# Patient Record
Sex: MALE | Race: WHITE | NOT HISPANIC OR LATINO | Employment: FULL TIME | URBAN - METROPOLITAN AREA
[De-identification: names, ages, dates, MRNs, and addresses within clinical notes are randomized per-mention and may not be internally consistent; named-entity substitution may affect disease eponyms.]

---

## 2021-01-19 ENCOUNTER — APPOINTMENT (OUTPATIENT)
Dept: GENERAL RADIOLOGY | Facility: HOSPITAL | Age: 31
End: 2021-01-19

## 2021-01-19 ENCOUNTER — HOSPITAL ENCOUNTER (EMERGENCY)
Facility: HOSPITAL | Age: 31
Discharge: HOME | End: 2021-01-19
Attending: EMERGENCY MEDICINE | Admitting: EMERGENCY MEDICINE

## 2021-01-19 VITALS
TEMPERATURE: 98.3 F | HEIGHT: 69 IN | SYSTOLIC BLOOD PRESSURE: 133 MMHG | OXYGEN SATURATION: 95 % | BODY MASS INDEX: 46.65 KG/M2 | WEIGHT: 315 LBS | DIASTOLIC BLOOD PRESSURE: 79 MMHG | HEART RATE: 90 BPM | RESPIRATION RATE: 18 BRPM

## 2021-01-19 DIAGNOSIS — R10.11 RIGHT UPPER QUADRANT ABDOMINAL PAIN: Primary | ICD-10-CM

## 2021-01-19 DIAGNOSIS — R80.9 PROTEINURIA, UNSPECIFIED TYPE: ICD-10-CM

## 2021-01-19 LAB
ALBUMIN SERPL-MCNC: 4 G/DL (ref 3.5–5.2)
ALBUMIN/GLOB SERPL: 1.1 G/DL
ALP SERPL-CCNC: 68 U/L (ref 39–117)
ALT SERPL W P-5'-P-CCNC: 56 U/L (ref 1–41)
ANION GAP SERPL CALCULATED.3IONS-SCNC: 8.7 MMOL/L (ref 5–15)
AST SERPL-CCNC: 37 U/L (ref 1–40)
BACTERIA UR QL AUTO: ABNORMAL /HPF
BASOPHILS # BLD AUTO: 0.02 10*3/MM3 (ref 0–0.2)
BASOPHILS NFR BLD AUTO: 0.2 % (ref 0–1.5)
BILIRUB SERPL-MCNC: 0.3 MG/DL (ref 0–1.2)
BILIRUB UR QL STRIP: NEGATIVE
BUN SERPL-MCNC: 14 MG/DL (ref 6–20)
BUN/CREAT SERPL: 14.7 (ref 7–25)
CALCIUM SPEC-SCNC: 8.6 MG/DL (ref 8.6–10.5)
CHLORIDE SERPL-SCNC: 105 MMOL/L (ref 98–107)
CLARITY UR: CLEAR
CO2 SERPL-SCNC: 23.3 MMOL/L (ref 22–29)
COLOR UR: YELLOW
CREAT SERPL-MCNC: 0.95 MG/DL (ref 0.76–1.27)
DEPRECATED RDW RBC AUTO: 40.9 FL (ref 37–54)
EOSINOPHIL # BLD AUTO: 0.14 10*3/MM3 (ref 0–0.4)
EOSINOPHIL NFR BLD AUTO: 1.3 % (ref 0.3–6.2)
ERYTHROCYTE [DISTWIDTH] IN BLOOD BY AUTOMATED COUNT: 12.6 % (ref 12.3–15.4)
GFR SERPL CREATININE-BSD FRML MDRD: 93 ML/MIN/1.73
GLOBULIN UR ELPH-MCNC: 3.5 GM/DL
GLUCOSE SERPL-MCNC: 115 MG/DL (ref 65–99)
GLUCOSE UR STRIP-MCNC: NEGATIVE MG/DL
HCT VFR BLD AUTO: 44.4 % (ref 37.5–51)
HGB BLD-MCNC: 14.7 G/DL (ref 13–17.7)
HGB UR QL STRIP.AUTO: NEGATIVE
HYALINE CASTS UR QL AUTO: ABNORMAL /LPF
IMM GRANULOCYTES # BLD AUTO: 0.02 10*3/MM3 (ref 0–0.05)
IMM GRANULOCYTES NFR BLD AUTO: 0.2 % (ref 0–0.5)
KETONES UR QL STRIP: NEGATIVE
LEUKOCYTE ESTERASE UR QL STRIP.AUTO: NEGATIVE
LIPASE SERPL-CCNC: 33 U/L (ref 13–60)
LYMPHOCYTES # BLD AUTO: 1.64 10*3/MM3 (ref 0.7–3.1)
LYMPHOCYTES NFR BLD AUTO: 15.7 % (ref 19.6–45.3)
MCH RBC QN AUTO: 29.7 PG (ref 26.6–33)
MCHC RBC AUTO-ENTMCNC: 33.1 G/DL (ref 31.5–35.7)
MCV RBC AUTO: 89.7 FL (ref 79–97)
MONOCYTES # BLD AUTO: 0.62 10*3/MM3 (ref 0.1–0.9)
MONOCYTES NFR BLD AUTO: 5.9 % (ref 5–12)
NEUTROPHILS NFR BLD AUTO: 76.7 % (ref 42.7–76)
NEUTROPHILS NFR BLD AUTO: 8.03 10*3/MM3 (ref 1.7–7)
NITRITE UR QL STRIP: NEGATIVE
PH UR STRIP.AUTO: <=5 [PH] (ref 4.5–8)
PLATELET # BLD AUTO: 234 10*3/MM3 (ref 140–450)
PMV BLD AUTO: 11 FL (ref 6–12)
POTASSIUM SERPL-SCNC: 4.6 MMOL/L (ref 3.5–5.2)
PROT SERPL-MCNC: 7.5 G/DL (ref 6–8.5)
PROT UR QL STRIP: ABNORMAL
RBC # BLD AUTO: 4.95 10*6/MM3 (ref 4.14–5.8)
RBC # UR: ABNORMAL /HPF
REF LAB TEST METHOD: ABNORMAL
SODIUM SERPL-SCNC: 137 MMOL/L (ref 136–145)
SP GR UR STRIP: 1.03 (ref 1–1.03)
SQUAMOUS #/AREA URNS HPF: ABNORMAL /HPF
UROBILINOGEN UR QL STRIP: ABNORMAL
WBC # BLD AUTO: 10.47 10*3/MM3 (ref 3.4–10.8)
WBC UR QL AUTO: ABNORMAL /HPF

## 2021-01-19 PROCEDURE — 81001 URINALYSIS AUTO W/SCOPE: CPT | Performed by: PHYSICIAN ASSISTANT

## 2021-01-19 PROCEDURE — 80053 COMPREHEN METABOLIC PANEL: CPT | Performed by: PHYSICIAN ASSISTANT

## 2021-01-19 PROCEDURE — 83690 ASSAY OF LIPASE: CPT | Performed by: PHYSICIAN ASSISTANT

## 2021-01-19 PROCEDURE — 85025 COMPLETE CBC W/AUTO DIFF WBC: CPT | Performed by: PHYSICIAN ASSISTANT

## 2021-01-19 PROCEDURE — 71046 X-RAY EXAM CHEST 2 VIEWS: CPT

## 2021-01-19 PROCEDURE — 99283 EMERGENCY DEPT VISIT LOW MDM: CPT

## 2021-01-19 RX ORDER — SODIUM CHLORIDE 0.9 % (FLUSH) 0.9 %
10 SYRINGE (ML) INJECTION AS NEEDED
Status: DISCONTINUED | OUTPATIENT
Start: 2021-01-19 | End: 2021-01-19 | Stop reason: HOSPADM

## 2021-01-19 RX ADMIN — SODIUM CHLORIDE 1000 ML: 9 INJECTION, SOLUTION INTRAVENOUS at 12:27

## 2021-01-19 NOTE — ED PROVIDER NOTES
EMERGENCY DEPARTMENT ENCOUNTER      Room Number: 07/07    History is provided by the patient, no translation services needed    HPI:    Chief complaint: Abdominal pain    Location: Right upper quadrant    Quality/Severity: Aching/5 out of 10    Timing/Duration: This morning/constant    Modifying Factors: Worse with sitting down    Associated Symptoms: Nausea, vomiting and diarrhea    Narrative: Pt is a 30 y.o. male who presents complaining of right upper quadrant pain times this morning.  Patient states that he had one episode of vomiting and one episode of diarrhea.  Patient denies any fever or chills.  Patient is he is a history of cholecystectomy.   Patient denies any dysuria or frequent urination.  Patient denies that the pain radiates anywhere else.      PMD: Provider, No Known    REVIEW OF SYSTEMS  Review of Systems   Constitutional: Negative for chills and fever.   Eyes: Negative for pain and visual disturbance.   Respiratory: Negative for cough and shortness of breath.    Cardiovascular: Negative for chest pain and palpitations.   Gastrointestinal: Positive for abdominal pain, diarrhea, nausea and vomiting.   Genitourinary: Negative for decreased urine volume, difficulty urinating, dysuria, flank pain, frequency, hematuria and urgency.   Musculoskeletal: Negative for gait problem and myalgias.   Skin: Negative for rash and wound.   Neurological: Negative for dizziness, syncope, numbness and headaches.   Psychiatric/Behavioral: Negative for confusion and suicidal ideas. The patient is not nervous/anxious.          PAST MEDICAL HISTORY  Active Ambulatory Problems     Diagnosis Date Noted   • No Active Ambulatory Problems     Resolved Ambulatory Problems     Diagnosis Date Noted   • No Resolved Ambulatory Problems     Past Medical History:   Diagnosis Date   • Hyperlipidemia    • Hypertension        PAST SURGICAL HISTORY  Past Surgical History:   Procedure Laterality Date   • CHOLECYSTECTOMY         FAMILY  HISTORY  Family History   Problem Relation Age of Onset   • Diabetes Mother    • Diabetes Father        SOCIAL HISTORY  Social History     Socioeconomic History   • Marital status:      Spouse name: Not on file   • Number of children: Not on file   • Years of education: Not on file   • Highest education level: Not on file   Tobacco Use   • Smoking status: Never Smoker   • Smokeless tobacco: Never Used   Substance and Sexual Activity   • Alcohol use: No   • Drug use: No   • Sexual activity: Defer       ALLERGIES  Penicillins    No current facility-administered medications for this encounter.     Current Outpatient Medications:   •  losartan (COZAAR) 25 MG tablet, Take 1 tablet by mouth Daily., Disp: 30 tablet, Rfl: 1    PHYSICAL EXAM  ED Triage Vitals   Temp Heart Rate Resp BP SpO2   01/19/21 1155 01/19/21 1152 01/19/21 1152 01/19/21 1152 01/19/21 1152   98.3 °F (36.8 °C) 95 18 171/91 97 %      Temp src Heart Rate Source Patient Position BP Location FiO2 (%)   01/19/21 1155 01/19/21 1152 -- -- --   Oral Monitor          Physical Exam   Constitutional: He is oriented to person, place, and time and well-developed, well-nourished, and in no distress.   HENT:   Head: Normocephalic and atraumatic.   Eyes: Conjunctivae and EOM are normal.   Neck: Normal range of motion. Neck supple.   Cardiovascular: Normal rate, regular rhythm and normal heart sounds.   Pulmonary/Chest: Effort normal and breath sounds normal. No respiratory distress.   Abdominal: Soft. Bowel sounds are normal. He exhibits no distension. There is abdominal tenderness in the right upper quadrant. There is no CVA tenderness, no tenderness at McBurney's point and negative Callejas's sign.   Musculoskeletal: Normal range of motion.         General: No edema.   Neurological: He is alert and oriented to person, place, and time. GCS score is 15.   Skin: Skin is warm and dry.   Psychiatric: Mood and affect normal.   Nursing note and vitals  reviewed.        LAB RESULTS  Lab Results (last 24 hours)     Procedure Component Value Units Date/Time    CBC & Differential [[id number]]  (Abnormal) Collected: 01/19/21 1229    Specimen: Blood Updated: 01/19/21 1251    Narrative:      The following orders were created for panel order CBC & Differential.  Procedure                               Abnormality         Status                     ---------                               -----------         ------                     CBC Auto Differential[[id number]]         Abnormal            Final result                 Please view results for these tests on the individual orders.    Comprehensive Metabolic Panel [[id number]]  (Abnormal) Collected: 01/19/21 1229    Specimen: Blood Updated: 01/19/21 1312     Glucose 115 mg/dL      BUN 14 mg/dL      Creatinine 0.95 mg/dL      Sodium 137 mmol/L      Potassium 4.6 mmol/L      Chloride 105 mmol/L      CO2 23.3 mmol/L      Calcium 8.6 mg/dL      Total Protein 7.5 g/dL      Albumin 4.00 g/dL      ALT (SGPT) 56 U/L      AST (SGOT) 37 U/L      Alkaline Phosphatase 68 U/L      Total Bilirubin 0.3 mg/dL      eGFR Non African Amer 93 mL/min/1.73      Globulin 3.5 gm/dL      A/G Ratio 1.1 g/dL      BUN/Creatinine Ratio 14.7     Anion Gap 8.7 mmol/L     Narrative:      GFR Normal >60  Chronic Kidney Disease <60  Kidney Failure <15      Lipase [[id number]]  (Normal) Collected: 01/19/21 1229    Specimen: Blood Updated: 01/19/21 1312     Lipase 33 U/L     CBC Auto Differential [[id number]]  (Abnormal) Collected: 01/19/21 1229    Specimen: Blood Updated: 01/19/21 1251     WBC 10.47 10*3/mm3      RBC 4.95 10*6/mm3      Hemoglobin 14.7 g/dL      Hematocrit 44.4 %      MCV 89.7 fL      MCH 29.7 pg      MCHC 33.1 g/dL      RDW 12.6 %      RDW-SD 40.9 fl      MPV 11.0 fL      Platelets 234 10*3/mm3      Neutrophil % 76.7 %      Lymphocyte % 15.7 %      Monocyte % 5.9 %      Eosinophil % 1.3 %      Basophil % 0.2 %      Immature Grans % 0.2 %       Neutrophils, Absolute 8.03 10*3/mm3      Lymphocytes, Absolute 1.64 10*3/mm3      Monocytes, Absolute 0.62 10*3/mm3      Eosinophils, Absolute 0.14 10*3/mm3      Basophils, Absolute 0.02 10*3/mm3      Immature Grans, Absolute 0.02 10*3/mm3     Urinalysis With Microscopic If Indicated (No Culture) - Urine, Clean Catch [[id number]]  (Abnormal) Collected: 01/19/21 1304    Specimen: Urine, Clean Catch Updated: 01/19/21 1316     Color, UA Yellow     Appearance, UA Clear     pH, UA <=5.0     Specific Gravity, UA 1.028     Comment: Result obtained by Refractometer        Glucose, UA Negative     Ketones, UA Negative     Bilirubin, UA Negative     Blood, UA Negative     Protein, UA >=300 mg/dL (3+)     Leuk Esterase, UA Negative     Nitrite, UA Negative     Urobilinogen, UA 0.2 E.U./dL    Urinalysis, Microscopic Only - Urine, Clean Catch [[id number]]  (Abnormal) Collected: 01/19/21 1304    Specimen: Urine, Clean Catch Updated: 01/19/21 1332     RBC, UA 0-2 /HPF      WBC, UA None Seen /HPF      Bacteria, UA None Seen /HPF      Squamous Epithelial Cells, UA None Seen /HPF      Hyaline Casts, UA 3-6 /LPF      Methodology Manual Light Microscopy            I ordered the above labs and reviewed the results    RADIOLOGY  Xr Chest 2 View    Result Date: 1/19/2021  CHEST X-RAY, 01/19/2021     HISTORY: 30-year-old male in the ED with new onset right side chest pain today.  TECHNIQUE: PA lateral upright chest series.  FINDINGS: Heart size and pulmonary vascularity are normal. The lungs are expanded and clear. No visible pulmonary infiltrate or pleural effusion.      Negative chest.  This report was finalized on 1/19/2021 12:37 PM by Dr. Tio Alfredo MD.        I ordered the above radiologic testing and reviewed the results    PROCEDURES  Procedures      PROGRESS AND CONSULTS  ED Course as of Jan 19 1726   Tue Jan 19, 2021   1725 30-year-old male presenting to the ED with complaints of right upper quadrant pain that with this  beginning today.  After history and physical exam, patient was noted to have tenderness with palpation of the right upper quadrant.  Laboratory studies were ordered.  Patient's white count was within normal limits.  Patient was shown to have proteinuria.  Patient's other laboratory results were unremarkable.  Discussed results with patient and encourage patient to follow-up with primary care provider regarding the peer to urinate.  Discussed with patient that if he had any worsening symptoms that he should return to the ED immediately.  Patient expressed verbal understanding of plan of care.    [GT]      ED Course User Index  [GT] Carline Mancilla PA-C           MEDICAL DECISION MAKING  Results were reviewed/discussed with the patient and they were also made aware of online access. Pt also made aware that some labs, such as cultures, will not be resulted during ER visit and follow up with PMD is necessary.     MDM       DIAGNOSIS  Final diagnoses:   Right upper quadrant abdominal pain   Proteinuria, unspecified type       Latest Documented Vital Signs:  As of 17:26 EST  BP- 133/79 HR- 90 Temp- 98.3 °F (36.8 °C) (Oral) O2 sat- 95%    DISPOSITION  Discharged home        Discussed pertinent labs and imaging findings with the patient/family.  Patient/Family voiced understanding of need to follow-up for recheck, further testing as needed.  Return to the emergency Department warnings were given.         Medication List      No changes were made to your prescriptions during this visit.             Follow-up Information     Provider, No Known In 1 day.    Why: To schedule a follow up appointment  Contact information:  [facility] [address] [phone]                     Dictated utilizing Dragon dictation     Carline Mancilla PA-C  01/19/21 9015

## 2022-01-21 ENCOUNTER — OFFICE VISIT (OUTPATIENT)
Dept: SLEEP MEDICINE | Facility: HOSPITAL | Age: 32
End: 2022-01-21

## 2022-01-21 VITALS
SYSTOLIC BLOOD PRESSURE: 168 MMHG | HEIGHT: 69 IN | HEART RATE: 60 BPM | DIASTOLIC BLOOD PRESSURE: 86 MMHG | BODY MASS INDEX: 46.65 KG/M2 | WEIGHT: 315 LBS

## 2022-01-21 DIAGNOSIS — G47.10 HYPERSOMNIA: ICD-10-CM

## 2022-01-21 DIAGNOSIS — E66.01 CLASS 3 SEVERE OBESITY DUE TO EXCESS CALORIES WITHOUT SERIOUS COMORBIDITY WITH BODY MASS INDEX (BMI) OF 60.0 TO 69.9 IN ADULT: ICD-10-CM

## 2022-01-21 DIAGNOSIS — R06.83 SNORING: ICD-10-CM

## 2022-01-21 DIAGNOSIS — G47.30 OBSERVED SLEEP APNEA: Primary | ICD-10-CM

## 2022-01-21 DIAGNOSIS — G47.8 NON-RESTORATIVE SLEEP: ICD-10-CM

## 2022-01-21 PROCEDURE — 99204 OFFICE O/P NEW MOD 45 MIN: CPT | Performed by: INTERNAL MEDICINE

## 2022-01-21 PROCEDURE — G0463 HOSPITAL OUTPT CLINIC VISIT: HCPCS

## 2022-01-21 RX ORDER — VALSARTAN AND HYDROCHLOROTHIAZIDE 320; 25 MG/1; MG/1
1 TABLET, FILM COATED ORAL DAILY
COMMUNITY
Start: 2021-05-06 | End: 2022-12-17

## 2022-01-21 RX ORDER — ATORVASTATIN CALCIUM 40 MG/1
40 TABLET, FILM COATED ORAL DAILY
COMMUNITY
Start: 2021-12-17

## 2022-01-21 NOTE — PROGRESS NOTES
[facility]  [address] [address]  Phone   Fax       Jared LAL Malik  1990  31 y.o.  male      Referring physician/provider and PCP Abigail Rosen APRN    Type of service: Initial Sleep Medicine Consult.  Date of service: 1/21/2022      Chief Complaint   Patient presents with   • Witnessed Apnea   • Snoring   • Fatigue   • Non-restorative Sleep   • Daytime Sleepiness   • Obesity       History of present illness;  Thank you for asking to see Jared LAL Malik, 31 y.o.. The patient was seen today on 1/21/2022 at [facility].  The patient presents today with symptoms of snoring, non-restorative sleep and witnessed apneas. The symptoms are present for many years and they are persistent in nature.  The snoring is present in all positions and it is loud.  Has no history of prior sleep evaluation and sleep studies. Patient has no prior surgery namely tonsillectomy, nasal surgery and UPPP.  He works in the [employer].  He is also been gaining weight and has been told that he stops breathing.    Patient gives the following sleep history.  Sleep schedule:  Bedtime: 9 PM  Wake time: 4:30 AM  Normally takes about 30-60 minutes to fall asleep  Average hours of sleep 6  Number of naps per day none  Symptoms  In addition to snoring, nonrestorative sleep and witnessed apneas patient gives the following associated symptoms.  Have you ever awakened gasping for breath, coughing, choking: Yes   Change in weight,  Yes gained about 100 pounds  Morning headaches  No   Awaken with a sore throat or dry mouth  No   Leg jerking at night:  Yes   Crawly feeling/urge sensation to move in the legs: No   Teeth grinding:No   Have you ever awakened at night with a sour taste or burning sensation in your chest:  No   Do you have muscle weakness with laughing or anger or sleep paralysis:  No   Have you ever felt paralyzed while going to sleep or waking up:  No  "  Sleepwalking, nightmares, No   Nocturia (urination at night): 0 times per night  Memory Problem:Yes     MEDICAL CONDITIONS (PMH)  1. Hypertension  2. Diabetes mellitus  3. Morbid obesity with weight    Social history:  Do you drive a commercial vehicle:  No   Shift work:  No   Tobacco use:  No   Alcohol use: 0 per week  Caffeinated drinks: 4    Family Hx (your parents and siblings)  1. Sleep apnea with father    Medications: reviewed    Review of systems:  Sleep: Positve for snoring,witnessed apnea and daytime excessive sleepiness with [place] Sleepiness Scale of Total score: 9   Kidney/ Bladder  Difficulty In Urination: negative  Bed Wetting: negative  Frequent Urination: positive  HEENT  Recurrent Nose Bleeds: negative  Ear pain: negative  Sores In Mouth: negative  Persistent Hoarseness: negative  Nasal Congestion: negative  Post Nasal Drip: negative  Musculoskeletal:  Neck Pain: negative  Temporomandibular Joint Pain: negative  General:  Fever: negative  Fatigue: positive  Cardiovascular:  Irregular Heartbeat: negative  Swollen Ankles Or Legs: negative  Respiratory:  Shortness Of Breath: negative  Wheezing: negative  Neuro/Paych:  Fainting Spells: negative  Dizziness: negative  Anxiety: negative  Depression: negative  Gastrointestinal:  Problem Swallowing: negative  Frequent Heartburn: negative  Abdominal Bloating: negative  Skin:  Rash: negative  Change In Nails: negative  Endocrine:  Excessive Thirst: negative  Always Too Cold: negative  Always Too Warm: negative  Hem/Lymphatic:  Swollen Glands: negative  Burses/ Bleeds Easily: negative    Physical exam:  CONSTITUTINONAL:  Vitals:    01/21/22 1100   BP: 168/86   Pulse: 60   Weight: (!) 191 kg (420 lb)   Height: 175.3 cm (69\")    Body mass index is 62.02 kg/m².   HEAD: atraumatic, normocephalic   EYES:pupils are round, equal and reacting to light and accommodation, conjunctiva normal  NOSE:no nasal septal defects, nasal passages are clear, no nasal polyps, "   THROAT: tonsils are not enlarged, tongue normal size, oral airway Mallampati class 4  NECK:Neck Circumference: 20.25 inches, trachea is in the midline, thyroid not enlarged  RESPIRATORY SYSTEM: Breath sounds are normal, there are no wheezes  CARDIOVASULAR SYSTEM: Heart sounds are regular rhythm and normal rate, there are no murmurs or thrills, no edema  GASTROINTESTINAL: Soft and non-tender,liver not enlarged, no evidence of ascites  MUSCULOSKELETAL SYSTEM: Full range of motion's in all the 4 extremities, neck movement not restricted, temporomandibular joint movement normal and no tenderness  SKIN: Warm and moist, no cyanosis, no clubbing,  NEUROLOGICAL SYSTEM: Oriented x 3, no gross neurological defects, No speech defect, gait is normal  PSYCHIATRIC SYSTEM: Mood is normal, thought content is normal    Office notes from care team reviewed.  Patient brought his primary care office notes      Assessment and plan:  · Witnessed apneas,(R06.81) patient's symptoms and examination is consistent with sleep apnea (G47.30). I have talked to the patient about the signs and symptoms of sleep apnea. In addition, I have also discussed pathophysiology of sleep apnea.  I also discussed the complications of untreated sleep apnea including effects on hypertension, diabetes mellitus and nonrestorative sleep with hypersomnia which can increase risk for motor vehicle accidents.  Untreated sleep apnea is also a risk factor for development of atrial fibrillation, pulmonary hypertension and stroke.  Discussed in detail of various testing methods including home-based and lab based sleep studies.  Based on history and physical examination and other comorbidities the most appropriate study is home sleep test.  The order for the sleep study is placed in [facility].  The test will be scheduled after approval from insurance. Treatment and management will be discussed after the test is completed.  Patient was given opportunity to ask questions and  all the questions were answered.   · Snoring (R06.83), snoring is the sound created by turbulent airflow vibrating upper airway soft tissue due to limitation of inspiratory airflow. I have also discussed factors affecting snoring including sleep deprivation, sleeping on the back and alcohol ingestion. To minimize snoring, patient is advised to have adequate sleep, sleep on the side and avoid alcohol and sedative medications before bedtime  · Daytime excessive sleepiness .  It was assessed with [place] Sleepiness Scale of Total score: 9.  There are many causes for daytime excessive sleepiness including sleep depression, shiftwork syndrome, depression and other medical disorders including heart, kidney and liver failure.  The most serious cause of excessive sleepiness is due to neurological conditions like narcolepsy/cataplexy.  But the most common cause of excessive sleepiness is due to sleep apnea with frequent awakenings during sleep time.  I have discussed safety of driving and to remain vigilant while driving.  · Obesity class 3, patient's BMI is Body mass index is 62.02 kg/m².. I have discussed the relationship between weight and sleep apnea.There is direct correlation between weight and severity of sleep apnea.  Weight reduction is encouraged, as it is going to reduce the severity of sleep apnea. I have also discussed with the patient diet and exercise to achieve ideal body weight.  · Hypertension,   Essential hypertension is highly correlated with sleep apnea. Treating sleep apnea will assist in good blood pressure control.  · Diabetes mellitus    I have also discussed with the patient the following  • Sleep hygiene: Maintaining a regular bedtime and wake time, not to watch television or work in bed, limit caffeine-containing beverages before bed time and avoid naps during the day  • Adequate amount of sleep.  Generally most people needs about 7 to 8 hours of sleep.    Return for 31 to 90 days after PAP setup  with down load..  Patient's questions were answered      I once again thank you for asking me to see this patient in consultation and I have forwarded my opinion and treatment plan.  Please do not hesitate to call me if you have any questions.     Manohar Peña MD  Sleep Medicine  Medical Director, [facility]  1/21/2022 ,

## 2022-01-28 ENCOUNTER — HOSPITAL ENCOUNTER (OUTPATIENT)
Dept: SLEEP MEDICINE | Facility: HOSPITAL | Age: 32
Discharge: HOME | End: 2022-01-28
Admitting: INTERNAL MEDICINE

## 2022-01-28 DIAGNOSIS — G47.30 OBSERVED SLEEP APNEA: ICD-10-CM

## 2022-01-28 DIAGNOSIS — E66.01 CLASS 3 SEVERE OBESITY DUE TO EXCESS CALORIES WITHOUT SERIOUS COMORBIDITY WITH BODY MASS INDEX (BMI) OF 60.0 TO 69.9 IN ADULT: ICD-10-CM

## 2022-01-28 DIAGNOSIS — G47.10 HYPERSOMNIA: ICD-10-CM

## 2022-01-28 DIAGNOSIS — R06.83 SNORING: ICD-10-CM

## 2022-01-28 DIAGNOSIS — G47.8 NON-RESTORATIVE SLEEP: ICD-10-CM

## 2022-01-28 PROCEDURE — 95806 SLEEP STUDY UNATT&RESP EFFT: CPT

## 2022-01-28 PROCEDURE — 95806 SLEEP STUDY UNATT&RESP EFFT: CPT | Performed by: INTERNAL MEDICINE

## 2022-02-08 ENCOUNTER — HOSPITAL ENCOUNTER (EMERGENCY)
Facility: HOSPITAL | Age: 32
Discharge: HOME | End: 2022-02-08
Attending: EMERGENCY MEDICINE | Admitting: EMERGENCY MEDICINE

## 2022-02-08 ENCOUNTER — APPOINTMENT (OUTPATIENT)
Dept: GENERAL RADIOLOGY | Facility: HOSPITAL | Age: 32
End: 2022-02-08

## 2022-02-08 VITALS
OXYGEN SATURATION: 96 % | BODY MASS INDEX: 46.65 KG/M2 | SYSTOLIC BLOOD PRESSURE: 139 MMHG | RESPIRATION RATE: 18 BRPM | DIASTOLIC BLOOD PRESSURE: 69 MMHG | TEMPERATURE: 98.6 F | HEIGHT: 69 IN | HEART RATE: 88 BPM | WEIGHT: 315 LBS

## 2022-02-08 DIAGNOSIS — M25.561 ACUTE PAIN OF RIGHT KNEE: Primary | ICD-10-CM

## 2022-02-08 PROCEDURE — 99283 EMERGENCY DEPT VISIT LOW MDM: CPT | Performed by: EMERGENCY MEDICINE

## 2022-02-08 PROCEDURE — 73562 X-RAY EXAM OF KNEE 3: CPT

## 2022-02-08 PROCEDURE — 96372 THER/PROPH/DIAG INJ SC/IM: CPT

## 2022-02-08 PROCEDURE — 25010000002 KETOROLAC TROMETHAMINE PER 15 MG: Performed by: EMERGENCY MEDICINE

## 2022-02-08 PROCEDURE — 99283 EMERGENCY DEPT VISIT LOW MDM: CPT

## 2022-02-08 RX ORDER — KETOROLAC TROMETHAMINE 30 MG/ML
30 INJECTION, SOLUTION INTRAMUSCULAR; INTRAVENOUS EVERY 6 HOURS PRN
Status: DISCONTINUED | OUTPATIENT
Start: 2022-02-08 | End: 2022-02-08 | Stop reason: HOSPADM

## 2022-02-08 RX ADMIN — KETOROLAC TROMETHAMINE 30 MG: 30 INJECTION, SOLUTION INTRAMUSCULAR; INTRAVENOUS at 11:54

## 2022-02-08 NOTE — ED PROVIDER NOTES
Subjective   History of Present Illness  31-year-old male presents emergency room complaining of right knee pain after falling on ice today.  He says he thought he could walk it off but subsequently felt a little bit sharp and questionably stable but it did not give out on him.  Comes to the ER for evaluation.  Review of Systems   Musculoskeletal: Positive for arthralgias.       Past Medical History:   Diagnosis Date   • Hyperlipidemia    • Hypertension        Allergies   Allergen Reactions   • Penicillins        Past Surgical History:   Procedure Laterality Date   • CHOLECYSTECTOMY         Family History   Problem Relation Age of Onset   • Diabetes Mother    • Diabetes Father        Social History     Socioeconomic History   • Marital status:    Tobacco Use   • Smoking status: Never Smoker   • Smokeless tobacco: Never Used   Substance and Sexual Activity   • Alcohol use: No   • Drug use: No   • Sexual activity: Defer           Objective    ED Triage Vitals [02/08/22 1107]   Temp Heart Rate Resp BP SpO2   98.6 °F (37 °C) 88 18 139/69 96 %      Temp src Heart Rate Source Patient Position BP Location FiO2 (%)   Oral Monitor -- -- --       Physical Exam  INITIAL VITAL SIGNS: Reviewed by me.  Pulse ox normal  GENERAL: Alert. Well developed and well nourished. No respiratory distress.  HEAD: Normocephalic.   EYES: No conjunctival injection.  ENT: Oral mucosa is moist.  NECK: Supple. Full range of motion.  RESPIRATORY: Non-labored respirations.  EXTREMITIES: No deformity.  Right knee with red abrasion on the anterior lateral portion.  No obvious ligamentous laxity on testing.  There is no fibular head tenderness.    XR Knee 3 View Right    Result Date: 2/8/2022  RIGHT KNEE, 02/08/2022     HISTORY: 31-year-old male in the ED with knee pain after a fall this morning. Pain with ambulation.  TECHNIQUE: Three-view right knee series.  FINDINGS: No fracture, dislocation or other acute osseous abnormality is demonstrated.  Mild tricompartment degenerative arthropathy. No visible knee joint effusion or radiopaque soft tissue foreign body.      1. No acute osseous abnormality. 2. Mild degenerative arthropathy.  This report was finalized on 2/8/2022 12:05 PM by Dr. Tio Alfredo MD.        Procedures           ED Course  ED Course as of 02/08/22 1219   Tue Feb 08, 2022 1216 X-ray negative, will give him a knee immobilizer if he has a subjective instability and have him follow-up with Ortho as needed. [RO]      ED Course User Index  [RO] Amish Blackwood MD                                                 [facility]    Final diagnoses:   Acute pain of right knee       ED Disposition  ED Disposition     ED Disposition Condition Comment    Discharge Good           Derek Corbett MD  [facility] [address] [id number] [phone]    Call   If not better in 1 week.         Medication List      No changes were made to your prescriptions during this visit.          Amish Blackwood MD  02/08/22 1217

## 2022-02-08 NOTE — DISCHARGE INSTRUCTIONS
Please please take ibuprofen and Tylenol according to label instructions.  Please follow-up with your primary care physician.  If you have persistent knee pain please call the number listed to arrange a follow-up appointment with Dr. Corbett of orthopedics.

## 2022-02-17 ENCOUNTER — TELEPHONE (OUTPATIENT)
Dept: SLEEP MEDICINE | Facility: HOSPITAL | Age: 32
End: 2022-02-17

## 2022-02-17 NOTE — TELEPHONE ENCOUNTER
Called patient with HST results. Per medical release, left detailed message. Sending order to [facility]. Advised patient in message to call and schedule a compliance visit once unit is received.

## 2022-03-17 ENCOUNTER — TELEPHONE (OUTPATIENT)
Dept: SLEEP MEDICINE | Facility: HOSPITAL | Age: 32
End: 2022-03-17

## 2022-03-31 ENCOUNTER — OFFICE VISIT (OUTPATIENT)
Dept: CARDIOLOGY | Facility: CLINIC | Age: 32
End: 2022-03-31

## 2022-03-31 VITALS
DIASTOLIC BLOOD PRESSURE: 90 MMHG | HEART RATE: 74 BPM | OXYGEN SATURATION: 97 % | RESPIRATION RATE: 16 BRPM | BODY MASS INDEX: 46.65 KG/M2 | HEIGHT: 69 IN | SYSTOLIC BLOOD PRESSURE: 120 MMHG | WEIGHT: 315 LBS

## 2022-03-31 DIAGNOSIS — R07.9 CHEST PAIN WITH MODERATE RISK FOR CARDIAC ETIOLOGY: Primary | ICD-10-CM

## 2022-03-31 DIAGNOSIS — E78.2 MIXED HYPERLIPIDEMIA: ICD-10-CM

## 2022-03-31 DIAGNOSIS — E66.01 MORBIDLY OBESE: ICD-10-CM

## 2022-03-31 DIAGNOSIS — I10 ESSENTIAL HYPERTENSION: ICD-10-CM

## 2022-03-31 DIAGNOSIS — R06.02 EXERTIONAL SHORTNESS OF BREATH: ICD-10-CM

## 2022-03-31 PROCEDURE — 93000 ELECTROCARDIOGRAM COMPLETE: CPT | Performed by: INTERNAL MEDICINE

## 2022-03-31 PROCEDURE — 99204 OFFICE O/P NEW MOD 45 MIN: CPT | Performed by: INTERNAL MEDICINE

## 2022-03-31 RX ORDER — GLIPIZIDE 5 MG/1
TABLET ORAL
COMMUNITY
Start: 2022-03-25

## 2022-03-31 NOTE — PROGRESS NOTES
PATIENTINFORMATION    Date of Office Visit: 2022  Encounter Provider: Norbert Tony MD  Place of Service: [facility] CARDIOLOGY  Patient Name: Jared Gan  : 1990    Subjective:     Encounter Date:2022      Patient ID: Jared Gan is a 32 y.o. male.    No chief complaint on file.    HPI  Mr. Gan is a pleasant 31 yo man with pmh of hypertension, type 2 mellitus, hyperlipidemia and morbid obesity referred to cardiology clinic for evaluation of chest discomfort.   He started having chest pain about a year ago localized to the retrosternal area that he describes as sore muscles and comes both during exertion and rest.  Lasts for few minutes.  Pain frequency has increased lately happening almost every day during activities and rest.  He has some shortness of breath but denies any orthopnea, PND, palpitations, presyncope syncope or extremity swelling.  Denies radiation of pain.  Denies any prior use of tobacco.  He is mom had heart attack in her 40s and had a stents in open heart surgery but she smoked heavily.  His dad is alive.  No heart disease among siblings    He was diagnosed with sleep apnea and going to start wearing CPAP today.      He reports his blood sugar being well controlled and blood pressure running normal and lipid panel at goal on statin.      ROS  All systems reviewed and negative except as noted in HPI.    Past Medical History:   Diagnosis Date   • Hyperlipidemia    • Hypertension        Past Surgical History:   Procedure Laterality Date   • CHOLECYSTECTOMY         Social History     Socioeconomic History   • Marital status:    Tobacco Use   • Smoking status: Never Smoker   • Smokeless tobacco: Never Used   Substance and Sexual Activity   • Alcohol use: No   • Drug use: No   • Sexual activity: Defer       Family History   Problem Relation Age of Onset   • Hyperlipidemia Mother    • Hypertension Mother    • Heart disease Mother    • Diabetes  "Mother    • Stroke Mother    • Hyperlipidemia Father    • Hypertension Father    • Heart disease Father    • Diabetes Father    • Lung cancer Paternal Grandfather            ECG 12 Lead    Date/Time: 3/31/2022 2:25 PM  Performed by: Norbert Tony MD  Authorized by: Norbert Tony MD   Comparison: not compared with previous ECG   Rhythm: sinus rhythm  Rate: normal  Conduction: conduction normal  ST Segments: ST segments normal  T Waves: T waves normal  QRS axis: normal  Other: no other findings  Other findings: low voltage    Clinical impression: abnormal EKG               Objective:     /90   Pulse 74   Resp 16   Ht 175.3 cm (69\")   Wt (!) 189 kg (417 lb)   SpO2 97%   BMI 61.58 kg/m²  Body mass index is 61.58 kg/m².     Constitutional:       General: Not in acute distress.     Appearance: Morbidly obese. Not diaphoretic.   Eyes:      Pupils: Pupils are equal, round, and reactive to light.   HENT:      Head: Normocephalic and atraumatic.   Neck:      Thyroid: No thyromegaly.   Pulmonary:      Effort: Pulmonary effort is normal. No respiratory distress.      Breath sounds: Normal breath sounds. No wheezing. No rales.   Chest:      Chest wall: Not tender to palpatation.   Cardiovascular:      Normal rate. Regular rhythm.      No gallop.   Pulses:     Intact distal pulses.   Edema:     Peripheral edema absent.   Abdominal:      General: Bowel sounds are normal. There is no distension.      Palpations: Abdomen is soft.      Tenderness: There is no guarding.   Musculoskeletal: Normal range of motion.         General: No deformity.      Cervical back: Normal range of motion and neck supple. Skin:     General: Skin is warm and dry.      Findings: No rash.   Neurological:      Mental Status: Alert and oriented to person, place, and time.      Cranial Nerves: No cranial nerve deficit.      Deep Tendon Reflexes: Reflexes are normal and symmetric.   Psychiatric:         Judgment: Judgment normal. "         Review Of Data: I have reviewed pertinent recent labs, images and documents and pertinent findings included in HPI or assessment below.          Assessment/Plan:         1.  Chest pain with concerning quality for angina and significant risk factors.  -do PET stress  -Echocardiogram   2.  Type 2 diabetes mellitus  3.  Hypertension-controlled  4.  Hypercholesterolemia- on statin   5.  Morbidly obese with sleep apnea- Got his CPAP machine today     I will put referral for bariatric surgery nutrition services.      Diagnosis and plan of care discussed with patient and verbalized understanding.            Your medication list          Accurate as of March 31, 2022  2:45 PM. If you have any questions, ask your nurse or doctor.            CONTINUE taking these medications      Instructions Last Dose Given Next Dose Due   atorvastatin 40 MG tablet  Commonly known as: LIPITOR      Take 40 mg by mouth Daily.       glipizide 5 MG tablet  Commonly known as: GLUCOTROL           valsartan-hydrochlorothiazide 320-25 MG per tablet  Commonly known as: DIOVAN-HCT      Take 1 tablet by mouth Daily.          STOP taking these medications    losartan 25 MG tablet  Commonly known as: COZAAR  Stopped by: Norbert Tony MD        metFORMIN 500 MG tablet  Commonly known as: GLUCOPHAGE  Stopped by: MD Norbert Caceres MD  03/31/22  14:45 EDT

## 2022-04-22 ENCOUNTER — HOSPITAL ENCOUNTER (OUTPATIENT)
Dept: CARDIOLOGY | Facility: HOSPITAL | Age: 32
Discharge: HOME | End: 2022-04-22

## 2022-04-22 VITALS
SYSTOLIC BLOOD PRESSURE: 162 MMHG | WEIGHT: 315 LBS | HEART RATE: 50 BPM | BODY MASS INDEX: 46.65 KG/M2 | HEIGHT: 69 IN | DIASTOLIC BLOOD PRESSURE: 88 MMHG

## 2022-04-22 VITALS — HEIGHT: 69 IN | WEIGHT: 315 LBS | BODY MASS INDEX: 46.65 KG/M2

## 2022-04-22 DIAGNOSIS — R06.02 EXERTIONAL SHORTNESS OF BREATH: ICD-10-CM

## 2022-04-22 DIAGNOSIS — R07.9 CHEST PAIN WITH MODERATE RISK FOR CARDIAC ETIOLOGY: ICD-10-CM

## 2022-04-22 LAB
ASCENDING AORTA: 3 CM
BH CV ECHO MEAS - ACS: 2.49 CM
BH CV ECHO MEAS - AO MAX PG: 9.2 MMHG
BH CV ECHO MEAS - AO MEAN PG: 4.8 MMHG
BH CV ECHO MEAS - AO ROOT DIAM: 3 CM
BH CV ECHO MEAS - AO V2 MAX: 152 CM/SEC
BH CV ECHO MEAS - AO V2 VTI: 35.8 CM
BH CV ECHO MEAS - AVA(I,D): 2.6 CM2
BH CV ECHO MEAS - EDV(CUBED): 87.3 ML
BH CV ECHO MEAS - EDV(MOD-SP2): 251 ML
BH CV ECHO MEAS - EDV(MOD-SP4): 261 ML
BH CV ECHO MEAS - EF(MOD-BP): 64 %
BH CV ECHO MEAS - EF(MOD-SP2): 64.9 %
BH CV ECHO MEAS - EF(MOD-SP4): 62.5 %
BH CV ECHO MEAS - ESV(CUBED): 18.7 ML
BH CV ECHO MEAS - ESV(MOD-SP2): 88 ML
BH CV ECHO MEAS - ESV(MOD-SP4): 98 ML
BH CV ECHO MEAS - FS: 40.1 %
BH CV ECHO MEAS - IVS/LVPW: 1.19 CM
BH CV ECHO MEAS - IVSD: 1.55 CM
BH CV ECHO MEAS - LAT PEAK E' VEL: 18.7 CM/SEC
BH CV ECHO MEAS - LV DIASTOLIC VOL/BSA (35-75): 92.5 CM2
BH CV ECHO MEAS - LV MASS(C)D: 249.5 GRAMS
BH CV ECHO MEAS - LV MAX PG: 5.5 MMHG
BH CV ECHO MEAS - LV MEAN PG: 2.49 MMHG
BH CV ECHO MEAS - LV SYSTOLIC VOL/BSA (12-30): 34.7 CM2
BH CV ECHO MEAS - LV V1 MAX: 117.5 CM/SEC
BH CV ECHO MEAS - LV V1 VTI: 26.6 CM
BH CV ECHO MEAS - LVIDD: 4.4 CM
BH CV ECHO MEAS - LVIDS: 2.7 CM
BH CV ECHO MEAS - LVOT AREA: 3.5 CM2
BH CV ECHO MEAS - LVOT DIAM: 2.11 CM
BH CV ECHO MEAS - LVPWD: 1.3 CM
BH CV ECHO MEAS - MED PEAK E' VEL: 13.8 CM/SEC
BH CV ECHO MEAS - MV A DUR: 0.13 SEC
BH CV ECHO MEAS - MV A MAX VEL: 72.8 CM/SEC
BH CV ECHO MEAS - MV DEC SLOPE: 473.7 CM/SEC2
BH CV ECHO MEAS - MV DEC TIME: 0.2 MSEC
BH CV ECHO MEAS - MV E MAX VEL: 124 CM/SEC
BH CV ECHO MEAS - MV E/A: 1.7
BH CV ECHO MEAS - MV MAX PG: 7.7 MMHG
BH CV ECHO MEAS - MV MEAN PG: 2.15 MMHG
BH CV ECHO MEAS - MV V2 VTI: 50.5 CM
BH CV ECHO MEAS - MVA(VTI): 1.84 CM2
BH CV ECHO MEAS - PA ACC TIME: 0.12 SEC
BH CV ECHO MEAS - PA PR(ACCEL): 25.5 MMHG
BH CV ECHO MEAS - PA V2 MAX: 164.2 CM/SEC
BH CV ECHO MEAS - PULM A REVS DUR: 0.17 SEC
BH CV ECHO MEAS - PULM A REVS VEL: 34.6 CM/SEC
BH CV ECHO MEAS - PULM DIAS VEL: 70 CM/SEC
BH CV ECHO MEAS - PULM SYS VEL: 57.7 CM/SEC
BH CV ECHO MEAS - RV MAX PG: 4.9 MMHG
BH CV ECHO MEAS - RV V1 MAX: 110.5 CM/SEC
BH CV ECHO MEAS - RV V1 VTI: 27.1 CM
BH CV ECHO MEAS - RVOT DIAM: 2.24 CM
BH CV ECHO MEAS - SI(MOD-SP2): 57.7 ML/M2
BH CV ECHO MEAS - SI(MOD-SP4): 57.7 ML/M2
BH CV ECHO MEAS - SV(LVOT): 93 ML
BH CV ECHO MEAS - SV(MOD-SP2): 163 ML
BH CV ECHO MEAS - SV(MOD-SP4): 163 ML
BH CV ECHO MEAS - SV(RVOT): 106.8 ML
BH CV ECHO MEAS - TAPSE (>1.6): 2.7 CM
BH CV ECHO MEASUREMENTS AVERAGE E/E' RATIO: 7.63
BH CV XLRA - RV BASE: 3.9 CM
BH CV XLRA - RV LENGTH: 8.6 CM
BH CV XLRA - RV MID: 3.6 CM
BH CV XLRA - TDI S': 15.1 CM/SEC
LEFT ATRIUM VOLUME INDEX: 22.3 ML/M2
MAXIMAL PREDICTED HEART RATE: 188 BPM
SINUS: 2.6 CM
STJ: 2.9 CM
STRESS TARGET HR: 160 BPM

## 2022-04-22 PROCEDURE — A9502 TC99M TETROFOSMIN: HCPCS | Performed by: INTERNAL MEDICINE

## 2022-04-22 PROCEDURE — 0 TECHNETIUM TETROFOSMIN KIT: Performed by: INTERNAL MEDICINE

## 2022-04-22 PROCEDURE — 78451 HT MUSCLE IMAGE SPECT SING: CPT | Performed by: INTERNAL MEDICINE

## 2022-04-22 PROCEDURE — 93306 TTE W/DOPPLER COMPLETE: CPT | Performed by: INTERNAL MEDICINE

## 2022-04-22 PROCEDURE — 25010000002 REGADENOSON 0.4 MG/5ML SOLUTION: Performed by: INTERNAL MEDICINE

## 2022-04-22 PROCEDURE — 93017 CV STRESS TEST TRACING ONLY: CPT

## 2022-04-22 PROCEDURE — 93016 CV STRESS TEST SUPVJ ONLY: CPT | Performed by: INTERNAL MEDICINE

## 2022-04-22 PROCEDURE — 93018 CV STRESS TEST I&R ONLY: CPT | Performed by: INTERNAL MEDICINE

## 2022-04-22 PROCEDURE — 78452 HT MUSCLE IMAGE SPECT MULT: CPT

## 2022-04-22 PROCEDURE — 78451 HT MUSCLE IMAGE SPECT SING: CPT

## 2022-04-22 PROCEDURE — 93306 TTE W/DOPPLER COMPLETE: CPT

## 2022-04-22 PROCEDURE — 25010000002 PERFLUTREN (DEFINITY) 8.476 MG IN SODIUM CHLORIDE (PF) 0.9 % 10 ML INJECTION: Performed by: INTERNAL MEDICINE

## 2022-04-22 RX ADMIN — TETROFOSMIN 1 DOSE: 1.38 INJECTION, POWDER, LYOPHILIZED, FOR SOLUTION INTRAVENOUS at 13:48

## 2022-04-22 RX ADMIN — PERFLUTREN 3 ML: 6.52 INJECTION, SUSPENSION INTRAVENOUS at 13:51

## 2022-04-22 RX ADMIN — REGADENOSON 0.4 MG: 0.08 INJECTION, SOLUTION INTRAVENOUS at 13:49

## 2022-04-29 ENCOUNTER — TELEPHONE (OUTPATIENT)
Dept: CARDIOLOGY | Facility: CLINIC | Age: 32
End: 2022-04-29

## 2022-04-29 LAB
BH CV NUCLEAR PRIOR STUDY: 3
BH CV STRESS BP STAGE 1: NORMAL
BH CV STRESS COMMENTS STAGE 1: NORMAL
BH CV STRESS DOSE REGADENOSON STAGE 1: 0.4
BH CV STRESS DURATION MIN STAGE 1: 0
BH CV STRESS DURATION SEC STAGE 1: 10
BH CV STRESS HR STAGE 1: 114
BH CV STRESS NUCLEAR ISOTOPE DOSE: 37.3 MCI
BH CV STRESS PROTOCOL 1: NORMAL
BH CV STRESS RECOVERY BP: NORMAL MMHG
BH CV STRESS RECOVERY HR: 90 BPM
BH CV STRESS STAGE 1: 1
LV EF NUC BP: 57 %
MAXIMAL PREDICTED HEART RATE: 188 BPM
PERCENT MAX PREDICTED HR: 60.64 %
STRESS BASELINE BP: NORMAL MMHG
STRESS BASELINE HR: 82 BPM
STRESS PERCENT HR: 71 %
STRESS POST EXERCISE DUR SEC: 10 SEC
STRESS POST PEAK BP: NORMAL MMHG
STRESS POST PEAK HR: 114 BPM
STRESS TARGET HR: 160 BPM

## 2022-05-02 NOTE — TELEPHONE ENCOUNTER
"Called Jared LAL Likes to review results/recommendations, however, the number listed is not working currently.  Called patient's spouse ([phone]) and received this message:     \"Call cannot be completed at this time.  Please hang up and try your call again later\"     Letter sent.     Thank you,  Josefina Hernandez RN  Triage Nurse [facility]    Results needing to be reviewed with patient:      "

## 2022-05-04 ENCOUNTER — TELEPHONE (OUTPATIENT)
Dept: CARDIOLOGY | Facility: CLINIC | Age: 32
End: 2022-05-04

## 2022-05-05 ENCOUNTER — APPOINTMENT (OUTPATIENT)
Dept: CARDIOLOGY | Facility: HOSPITAL | Age: 32
End: 2022-05-05

## 2022-05-05 NOTE — TELEPHONE ENCOUNTER
"Reviewed results and recommendations with Jared Gan.  Patient verbalized understanding.    Patient reports that his chest pain is \"not much\" and has decreased since he began using his CPAP machine.     Patient is also asking if you can/will refer him to bariatric surgery now that this testing is done?    Patient is scheduled for 6 month follow up.    Thank you,  Josefina Hernandez RN  Triage Nurse [facility]  "

## 2022-05-06 DIAGNOSIS — E66.01 MORBIDLY OBESE: Primary | ICD-10-CM

## 2022-05-06 NOTE — TELEPHONE ENCOUNTER
Left voicemail for Jared Zengs requesting callback.    Thank you,  Josefina Hernandez RN  Triage Nurse [facility]

## 2022-05-06 NOTE — TELEPHONE ENCOUNTER
Pt called back. Notified him of referral. He verbalized understanding.    Thank you,    Gladys Ahn, RN  Triage [facility]

## 2022-08-25 ENCOUNTER — OFFICE VISIT (OUTPATIENT)
Dept: CARDIOLOGY | Facility: CLINIC | Age: 32
End: 2022-08-25

## 2022-08-25 VITALS
HEIGHT: 69 IN | HEART RATE: 73 BPM | SYSTOLIC BLOOD PRESSURE: 128 MMHG | OXYGEN SATURATION: 96 % | RESPIRATION RATE: 16 BRPM | WEIGHT: 315 LBS | BODY MASS INDEX: 46.65 KG/M2 | DIASTOLIC BLOOD PRESSURE: 90 MMHG

## 2022-08-25 DIAGNOSIS — E66.01 MORBIDLY OBESE: Primary | ICD-10-CM

## 2022-08-25 DIAGNOSIS — E66.01 CLASS 3 SEVERE OBESITY DUE TO EXCESS CALORIES WITHOUT SERIOUS COMORBIDITY WITH BODY MASS INDEX (BMI) OF 60.0 TO 69.9 IN ADULT: ICD-10-CM

## 2022-08-25 DIAGNOSIS — E78.2 MIXED HYPERLIPIDEMIA: ICD-10-CM

## 2022-08-25 DIAGNOSIS — I10 ESSENTIAL HYPERTENSION: ICD-10-CM

## 2022-08-25 PROCEDURE — 99214 OFFICE O/P EST MOD 30 MIN: CPT | Performed by: INTERNAL MEDICINE

## 2022-08-25 PROCEDURE — 93000 ELECTROCARDIOGRAM COMPLETE: CPT | Performed by: INTERNAL MEDICINE

## 2022-08-25 NOTE — PROGRESS NOTES
PATIENTINFORMATION    Date of Office Visit: 2022  Encounter Provider: Norbert Tony MD  Place of Service: [facility] CARDIOLOGY  Patient Name: Jared Gan  : 1990    Subjective:     Encounter Date:2022      Patient ID: Jared Gan is a 32 y.o. male.    No chief complaint on file.    HPI  Mr. Gan is a pleasant 32 years old gentleman who came to cardiac clinic for follow-up visit.  He works for [email or web address] and reasonably active at work.  He denies any rest or exertional chest pain, significant shortness of breath, orthopnea, PND, palpitations, presyncope or syncope.  I referred him to bariatric surgery during prior visit but seems to have lost correspondence and there was miscommunication.  He reports episodes of low blood sugar on glipizide but advised him to talk to his PCP to switch him to other diabetic regimens which would also help with weight loss.  No ER visit or hospitalization since last clinic visit.  He has started to use CPAP that he has liked so far.      ROS  All systems reviewed and negative except as noted in HPI.    Past Medical History:   Diagnosis Date   • Hyperlipidemia    • Hypertension        Past Surgical History:   Procedure Laterality Date   • CHOLECYSTECTOMY         Social History     Socioeconomic History   • Marital status:    Tobacco Use   • Smoking status: Never Smoker   • Smokeless tobacco: Never Used   Substance and Sexual Activity   • Alcohol use: No   • Drug use: No   • Sexual activity: Defer       Family History   Problem Relation Age of Onset   • Hyperlipidemia Mother    • Hypertension Mother    • Heart disease Mother    • Diabetes Mother    • Stroke Mother    • Hyperlipidemia Father    • Hypertension Father    • Heart disease Father    • Diabetes Father    • Lung cancer Paternal Grandfather            ECG 12 Lead    Date/Time: 2022 11:55 AM  Performed by: Norbert Tony MD  Authorized by: Norbert Tony  "MD DAVE   Comparison: compared with previous ECG from 4/1/2022  Similar to previous ECG  Rhythm: sinus rhythm  Rate: normal  Conduction: conduction normal  Q waves: II, III and aVF    ST Segments: ST segments normal  T Waves: T waves normal  QRS axis: normal  Other: no other findings    Clinical impression: normal ECG               Objective:     /90   Pulse 73   Resp 16   Ht 175.3 cm (69\")   Wt (!) 190 kg (419 lb)   SpO2 96%   BMI 61.88 kg/m²  Body mass index is 61.88 kg/m².     Constitutional:       General: Not in acute distress.     Appearance: Morbidly obese. Not diaphoretic.   Eyes:      Pupils: Pupils are equal, round, and reactive to light.   HENT:      Head: Normocephalic and atraumatic.   Neck:      Thyroid: No thyromegaly.   Pulmonary:      Effort: Pulmonary effort is normal. No respiratory distress.      Breath sounds: Normal breath sounds. No wheezing. No rales.   Chest:      Chest wall: Not tender to palpatation.   Cardiovascular:      Normal rate. Regular rhythm.      No gallop.   Pulses:     Intact distal pulses.   Edema:     Peripheral edema absent.   Abdominal:      General: Bowel sounds are normal. There is no distension.      Palpations: Abdomen is soft.      Tenderness: There is no guarding.   Musculoskeletal: Normal range of motion.         General: No deformity.      Cervical back: Normal range of motion and neck supple. Skin:     General: Skin is warm and dry.      Findings: No rash.   Neurological:      Mental Status: Alert and oriented to person, place, and time.      Cranial Nerves: No cranial nerve deficit.      Deep Tendon Reflexes: Reflexes are normal and symmetric.   Psychiatric:         Judgment: Judgment normal.         Review Of Data: I have reviewed pertinent recent labs, images and documents and pertinent findings included in HPI or assessment below.          Assessment/Plan:         1.  Chest pain/exertional shortness of breath-improved.  Normal echocardiogram and " myocardial perfusion study in April 2022  2.  Essential hypertension that is fairly controlled on valsartan/hydrochlorothiazide  3.  Morbidly obese with complications including sleep apnea on CPAP  No significant weight change since last visit.  I have referred him to Dr. Frazier for bariatric surgery evaluation  4.  Hypercholesterolemia on statin  5.  Type II DM-with His PCP for further treatment adjustment that would help with weight loss. He reports side effects on glipizide.      Continue same care otherwise  Return to clinic in 1 year or sooner with any concerning symptoms.  Diagnosis and plan of care discussed with patient and verbalized understanding.            Your medication list          Accurate as of August 25, 2022 12:42 PM. If you have any questions, ask your nurse or doctor.            CONTINUE taking these medications      Instructions Last Dose Given Next Dose Due   atorvastatin 40 MG tablet  Commonly known as: LIPITOR      Take 40 mg by mouth Daily.       glipizide 5 MG tablet  Commonly known as: GLUCOTROL           valsartan-hydrochlorothiazide 320-25 MG per tablet  Commonly known as: DIOVAN-HCT      Take 1 tablet by mouth Daily.                  Norbert Tony MD  08/25/22  12:42 EDT

## 2023-09-07 ENCOUNTER — OFFICE VISIT (OUTPATIENT)
Dept: CARDIOLOGY | Facility: CLINIC | Age: 33
End: 2023-09-07
Payer: COMMERCIAL

## 2023-09-07 VITALS
HEART RATE: 55 BPM | BODY MASS INDEX: 46.65 KG/M2 | DIASTOLIC BLOOD PRESSURE: 82 MMHG | WEIGHT: 315 LBS | SYSTOLIC BLOOD PRESSURE: 124 MMHG | HEIGHT: 69 IN

## 2023-09-07 DIAGNOSIS — I10 ESSENTIAL HYPERTENSION: Primary | ICD-10-CM

## 2023-09-07 DIAGNOSIS — R73.03 PREDIABETES: ICD-10-CM

## 2023-09-07 DIAGNOSIS — E78.2 MIXED HYPERLIPIDEMIA: ICD-10-CM

## 2023-09-07 DIAGNOSIS — E66.01 CLASS 3 SEVERE OBESITY DUE TO EXCESS CALORIES WITHOUT SERIOUS COMORBIDITY WITH BODY MASS INDEX (BMI) OF 60.0 TO 69.9 IN ADULT: ICD-10-CM

## 2023-09-07 RX ORDER — SEMAGLUTIDE 0.68 MG/ML
0.25 INJECTION, SOLUTION SUBCUTANEOUS WEEKLY
COMMUNITY
Start: 2023-07-05

## 2023-09-07 RX ORDER — ROSUVASTATIN CALCIUM 40 MG/1
40 TABLET, COATED ORAL DAILY
COMMUNITY
Start: 2023-09-05

## 2023-09-07 NOTE — PROGRESS NOTES
CARDIOLOGY    Date of Office Visit: 2023  Patient Name: Jared Gan  : 1990  Encounter Provider: Jun Mcwilliams PA-C  Primary Cardiologist: Norbert Tony MD    CHIEF COMPLAINT / REASON FOR OFFICE VISIT     1 year follow up, HTN      HISTORY OF PRESENT ILLNESS     This is a 33 y.o. year old male who presents to [facility] CARDIOLOGY for a  1 year follow up of his cardiovascular health.     He initially followed up with our office last year after an episode of chest tightness.  He had an abnormal EKG that showed Q waves in the inferior leads.  This is still consistent today.  At that time he had a normal stress test and echocardiogram completed and was encouraged to follow-up in 1 year for further follow-up.    He has recently been started on Ozempic 2 months ago.  Since he is lost around 20 pounds.  He recently got a new PCP and she has been working with him to lose weight prior to considering bariatric surgery.  He had a friend's wife who had a DVT after surgery and had a very long prolonged recovery.  This is discouraging him from undergoing an evaluation with the bariatric surgeon.    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  I encouraged him to increase his step count over the next few months.  He does not routinely take blood pressures at home.  Does admit to decreased appetite since starting Ozempic.    PMHx: Hypertension, mixed hyperlipidemia, morbid obesity, obstructive sleep apnea with CPAP use, prediabetes      REVIEW OF SYSTEMS   Review of Systems   Constitutional: Positive for decreased appetite and weight loss. Negative for malaise/fatigue and weight gain.   Cardiovascular:  Positive for irregular heartbeat. Negative for chest pain, dyspnea on exertion, leg swelling, palpitations, paroxysmal nocturnal dyspnea and syncope.   Neurological:  Negative for dizziness and light-headedness.     PHYSICAL EXAMINATION     Vital Signs:  BP  "124/82 (BP Location: Left arm, Patient Position: Sitting)   Pulse 55   Ht 175.3 cm (69\")   Wt (!) 190 kg (418 lb)   BMI 61.73 kg/m²   Estimated body mass index is 61.73 kg/m² as calculated from the following:    Height as of this encounter: 175.3 cm (69\").    Weight as of this encounter: 190 kg (418 lb).             Physical Exam  Constitutional:       Appearance: Normal appearance.   HENT:      Head: Normocephalic and atraumatic.   Cardiovascular:      Rate and Rhythm: Normal rate and regular rhythm.      Pulses: Normal pulses.      Heart sounds: Normal heart sounds.   Pulmonary:      Effort: Pulmonary effort is normal.      Breath sounds: Normal breath sounds.   Musculoskeletal:      Right lower leg: No edema.      Left lower leg: No edema.   Skin:     General: Skin is warm and dry.   Neurological:      General: No focal deficit present.      Mental Status: He is alert and oriented to person, place, and time.        Cardiac Testing/Results     Cardiac Testing:   - Echo 4/22/2022: LVEF 61 to 65%, borderline LVH.  No valvular heart disease noted.  Negative bubble study.    -Lexiscan stress Test 4/29/2023: Normal stress test with no evidence of myocardial ischemia.  EF 57%    Result Review :  The following data was reviewed by: Jun Mcwilliams PA-C on 09/07/2023:       Lab Results   Component Value Date     01/19/2021    K 4.6 01/19/2021     01/19/2021    CO2 23.3 01/19/2021    BUN 14 01/19/2021    CREATININE 0.95 01/19/2021    EGFRIFNONA 93 01/19/2021    GLUCOSE 115 (H) 01/19/2021    CALCIUM 8.6 01/19/2021    ALBUMIN 4.00 01/19/2021    AST 37 01/19/2021    ALT 56 (H) 01/19/2021     Lab Results   Component Value Date    WBC 6.55 08/04/2023    WBC 6.43 02/27/2023    HGB 13.9 08/04/2023    HGB 13.9 02/27/2023    HCT 42.0 08/04/2023    HCT 42.4 02/27/2023    MCV 90.5 08/04/2023    MCV 91.4 02/27/2023     08/04/2023     02/27/2023     No results found for: PROBNP, BNP  No results found " for: CKTOTAL, CKMB, CKMBINDEX, TROPONINI, TROPONINT  No results found for: TSH    I reviewed his most recent blood work completed at [facility].  His most recent triglycerides were 204 and his LDL cholesterol was 84.  His HDL was 39.    Data reviewed : Consultant notes most recent PCP office visit note from 8/4/2023        ECG 12 Lead    Date/Time: 9/7/2023 11:46 AM  Performed by: Jun Stout PA-C  Authorized by: Jun Stout PA-C   Comparison: compared with previous ECG from 8/25/2022  Rhythm: sinus bradycardia  Rate: bradycardic  BPM: 55  Q waves: II, III and aVF    T Waves: T waves normal  QRS axis: normal    Clinical impression: non-specific ECG  Comments: Ongoing Q waves, sinus bradycardia noted.  QTc 419              ASSESSMENT & PLAN       Diagnoses and all orders for this visit:    1. Essential hypertension (Primary)  Blood pressure has been well controlled.  He is currently taking valsartan hydrochlorothiazide.  Does not keep home blood pressure logs, I encouraged him to start taking every few days.  As he is losing weight we will need to monitor for any low blood pressure readings and adjust his medications accordingly.  2. Mixed hyperlipidemia  Most recent LDL 84 and triglycerides 204.  ASCVD risk profile is unable to be calculated due to the patient's age.  As he is currently losing weight and increasing his activity will continue to monitor his triglyceride levels.  For now, continue atorvastatin 40 mg daily  3. Class 3 severe obesity due to excess calories without serious comorbidity with body mass index (BMI) of 60.0 to 69.9 in adult  Recently started on Ozempic, working closely with his PCP to encourage weight loss.  He has previously been referred for bariatric surgery but has declined further evaluation for them at this point.  4. Prediabetes  Most recent A1c was 7%.  Repeat blood work will be completed in 3 months.  Currently on Ozempic.      Follow Up:  Return in about 1 year  (around 9/7/2024) for Dr. Tony-Barbara.  Patient was given instructions and counseling regarding his condition or for health maintenance advice. Please contact office if worsening symptoms or proceed to ER when appropriate.      Jun Mcwilliams PA-C  09/07/23  11:44 EDT    MEDICATIONS         Discharge Medications            Accurate as of September 7, 2023 11:44 AM. If you have any questions, ask your nurse or doctor.                Continue These Medications        Instructions Start Date   atorvastatin 40 MG tablet  Commonly known as: LIPITOR   40 mg, Oral, Daily      Ozempic (0.25 or 0.5 MG/DOSE) 2 MG/3ML solution pen-injector  Generic drug: Semaglutide(0.25 or 0.5MG/DOS)   0.25 mg, Subcutaneous, Weekly      rosuvastatin 40 MG tablet  Commonly known as: CRESTOR   40 mg, Oral, Daily      valsartan-hydrochlorothiazide 320-25 MG per tablet  Commonly known as: DIOVAN-HCT   1 tablet, Oral, Daily                   **Valeria Disclaimer: This note was dictated using an electronic transcription. The electronic translation of spoken language may permit erroneous, or at times, nonsensical words or phrases to be inadvertently transcribed. Although I have reviewed the note for such errors, some may still exist.

## 2024-09-12 ENCOUNTER — OFFICE VISIT (OUTPATIENT)
Dept: CARDIOLOGY | Facility: CLINIC | Age: 34
End: 2024-09-12
Payer: COMMERCIAL

## 2024-09-12 VITALS
BODY MASS INDEX: 46.65 KG/M2 | RESPIRATION RATE: 18 BRPM | WEIGHT: 315 LBS | OXYGEN SATURATION: 99 % | HEART RATE: 60 BPM | DIASTOLIC BLOOD PRESSURE: 94 MMHG | SYSTOLIC BLOOD PRESSURE: 130 MMHG | HEIGHT: 69 IN

## 2024-09-12 DIAGNOSIS — E66.01 CLASS 3 SEVERE OBESITY DUE TO EXCESS CALORIES WITHOUT SERIOUS COMORBIDITY WITH BODY MASS INDEX (BMI) OF 60.0 TO 69.9 IN ADULT: ICD-10-CM

## 2024-09-12 DIAGNOSIS — E78.2 MIXED HYPERLIPIDEMIA: Primary | ICD-10-CM

## 2024-09-12 DIAGNOSIS — I10 ESSENTIAL HYPERTENSION: ICD-10-CM

## 2024-09-12 PROCEDURE — 93000 ELECTROCARDIOGRAM COMPLETE: CPT | Performed by: INTERNAL MEDICINE

## 2024-09-12 PROCEDURE — 99214 OFFICE O/P EST MOD 30 MIN: CPT | Performed by: INTERNAL MEDICINE

## 2024-09-12 RX ORDER — GLIPIZIDE 10 MG/1
10 TABLET ORAL
COMMUNITY
Start: 2024-08-19

## 2024-09-12 RX ORDER — ERGOCALCIFEROL 1.25 MG/1
50000 CAPSULE, LIQUID FILLED ORAL
COMMUNITY
Start: 2024-08-19

## 2024-09-12 RX ORDER — CYANOCOBALAMIN 1000 UG/ML
1000 INJECTION, SOLUTION INTRAMUSCULAR; SUBCUTANEOUS
COMMUNITY
Start: 2024-08-20 | End: 2025-01-17

## 2024-09-12 NOTE — PROGRESS NOTES
PATIENTINFORMATION    Date of Office Visit: 2024  Encounter Provider: Norbert Tony MD  Place of Service: [facility] CARDIOLOGY  Patient Name: Jared Gan  : 1990    Subjective:     Encounter Date:2024      Patient ID: Jared Gan is a 34 y.o. male.    Chief Complaint   Patient presents with    Follow-up     1 yr.    Chest Pain    Fatigue    Leg Swelling    Shortness of Breath    Palpitations     w/ Exertion       HPI  Mr. Gan is a pleasant 34 who came to cardiac clinic for follow-up visit.  He reports occasional intermittent rest and exertional chest discomfort since CPAP machine was revoked because of noncompliance.  He works at [employer] and fairly active and denies any significant limiting symptoms other than shortness of breath.  No palpitations, presyncope syncope or extremity swelling.  He lost some weight with Ozempic but developed side effects and currently waiting on prescription for Wegovy.  No recent ER visit hospitalization.      ROS  All systems reviewed and negative except as noted in HPI.    Past Medical History:   Diagnosis Date    Hyperlipidemia     Hypertension        Past Surgical History:   Procedure Laterality Date    CHOLECYSTECTOMY         Social History     Socioeconomic History    Marital status:    Tobacco Use    Smoking status: Never    Smokeless tobacco: Never   Vaping Use    Vaping status: Never Used   Substance and Sexual Activity    Alcohol use: No    Drug use: No    Sexual activity: Defer       Family History   Problem Relation Age of Onset    Hyperlipidemia Mother     Hypertension Mother     Heart disease Mother     Diabetes Mother     Stroke Mother     Hyperlipidemia Father     Hypertension Father     Heart disease Father     Diabetes Father     Lung cancer Paternal Grandfather            ECG 12 Lead    Date/Time: 2024 12:10 PM  Performed by: Norbert Tony MD    Authorized by: Norbert Tony MD   "Comparison: compared with previous ECG from 7/7/2023  Similar to previous ECG  Rhythm: sinus rhythm  Rate: normal  Conduction: conduction normal  Q waves: II, III and aVF    ST Segments: ST segments normal  T Waves: T waves normal  QRS axis: normal  Other: no other findings    Clinical impression: abnormal EKG             Objective:     /94 (BP Location: Right arm, Patient Position: Sitting, Cuff Size: Large Adult)   Pulse 60   Resp 18   Ht 175.3 cm (69\")   Wt (!) 196 kg (432 lb 8 oz)   SpO2 99%   BMI 63.87 kg/m²  Body mass index is 63.87 kg/m².     Constitutional:       General: Not in acute distress.     Appearance: Morbidly obese. Not diaphoretic.   Eyes:      Pupils: Pupils are equal, round, and reactive to light.   HENT:      Head: Normocephalic and atraumatic.   Neck:      Thyroid: No thyromegaly.   Pulmonary:      Effort: Pulmonary effort is normal. No respiratory distress.      Breath sounds: Normal breath sounds. No wheezing. No rales.   Chest:      Chest wall: Not tender to palpatation.   Cardiovascular:      Normal rate. Regular rhythm.      No gallop.    Pulses:     Intact distal pulses.   Edema:     Peripheral edema absent.   Abdominal:      General: Bowel sounds are normal. There is no distension.      Palpations: Abdomen is soft.      Tenderness: There is no guarding.   Musculoskeletal: Normal range of motion.         General: No deformity.      Cervical back: Normal range of motion and neck supple. Skin:     General: Skin is warm and dry.      Findings: No rash.   Neurological:      Mental Status: Alert and oriented to person, place, and time.      Cranial Nerves: No cranial nerve deficit.      Deep Tendon Reflexes: Reflexes are normal and symmetric.   Psychiatric:         Judgment: Judgment normal.         Review Of Data: I have reviewed pertinent recent labs, images and documents and pertinent findings included in HPI or assessment below.    Assessment and Plan   History of chest " pain/exertional shortness of breath.  EKG with inferior Q waves.  Normal echocardiogram and myocardial perfusion study in 2022   Essential hypertension that is fairly controlled on valsartan/hydrochlorothiazide  Morbidly obese with complications including sleep apnea .  I think most of his symptoms including chest pains from obesity complications including sleep apnea.  CPAP revoked by insurance because of noncompliance.  Patient reports being compliant  Hypercholesterolemia on statin  Type II DM-managed by PCP    We have discussed about regular exercise.  Also most of his comorbidities are related to weight.  I have encouraged him to get bariatric surgery but he is afraid as a wife of his friend  of blood clot after bariatric surgery.  Waiting on GLP-1 agonist prescription.    At this point he will follow-up with cardiology clinic as needed.    Diagnosis and plan of care discussed with patient and verbalized understanding.            Your medication list            Accurate as of 2024  1:11 PM. If you have any questions, ask your nurse or doctor.                CONTINUE taking these medications        Instructions Last Dose Given Next Dose Due   cyanocobalamin 1000 MCG/ML injection      Inject 1 mL into the appropriate muscle as directed by prescriber Every 30 (Thirty) Days.       glipizide 10 MG tablet  Commonly known as: GLUCOTROL      Take 1 tablet by mouth 2 (Two) Times a Day Before Meals.       Mounjaro 2.5 MG/0.5ML solution pen-injector pen  Generic drug: Tirzepatide      Inject 0.5 mL under the skin into the appropriate area as directed 1 (One) Time Per Week.       rosuvastatin 40 MG tablet  Commonly known as: CRESTOR      Take 1 tablet by mouth Daily.       valsartan-hydrochlorothiazide 320-25 MG per tablet  Commonly known as: DIOVAN-HCT      Take 1 tablet by mouth Daily.       vitamin D 1.25 MG (54811 UT) capsule capsule  Commonly known as: ERGOCALCIFEROL      Take 1 capsule by  mouth Every 7 (Seven) Days.                    Norbert Tony MD  09/12/24  13:11 EDT